# Patient Record
Sex: MALE | Race: WHITE | URBAN - METROPOLITAN AREA
[De-identification: names, ages, dates, MRNs, and addresses within clinical notes are randomized per-mention and may not be internally consistent; named-entity substitution may affect disease eponyms.]

---

## 2023-05-06 ENCOUNTER — OUT OF OFFICE VISIT (OUTPATIENT)
Dept: URBAN - METROPOLITAN AREA MEDICAL CENTER 1 | Facility: MEDICAL CENTER | Age: 63
End: 2023-05-06

## 2023-05-06 ENCOUNTER — LAB OUTSIDE AN ENCOUNTER (OUTPATIENT)
Dept: URBAN - NONMETROPOLITAN AREA CLINIC 2 | Facility: CLINIC | Age: 63
End: 2023-05-06

## 2023-05-06 ENCOUNTER — CLAIMS CREATED FROM THE CLAIM WINDOW (OUTPATIENT)
Dept: URBAN - METROPOLITAN AREA MEDICAL CENTER 1 | Facility: MEDICAL CENTER | Age: 63
End: 2023-05-06
Payer: MEDICARE

## 2023-05-06 DIAGNOSIS — K31.A11 GASTRIC INTESTINAL METAPLASIA WITHOUT DYSPLASIA, INVOLVING THE ANTRUM: ICD-10-CM

## 2023-05-06 DIAGNOSIS — K29.40 ATROPHIC GASTRITIS: ICD-10-CM

## 2023-05-06 DIAGNOSIS — D50.9 ANEMIA: ICD-10-CM

## 2023-05-06 DIAGNOSIS — D12.2 ADENOMA OF ASCENDING COLON: ICD-10-CM

## 2023-05-06 DIAGNOSIS — D12.5 ADENOMA OF SIGMOID COLON: ICD-10-CM

## 2023-05-06 PROCEDURE — 99232 SBSQ HOSP IP/OBS MODERATE 35: CPT | Performed by: INTERNAL MEDICINE

## 2023-05-06 PROCEDURE — 43239 EGD BIOPSY SINGLE/MULTIPLE: CPT | Performed by: INTERNAL MEDICINE

## 2023-05-06 PROCEDURE — 45385 COLONOSCOPY W/LESION REMOVAL: CPT | Performed by: INTERNAL MEDICINE

## 2023-05-08 ENCOUNTER — LAB OUTSIDE AN ENCOUNTER (OUTPATIENT)
Dept: URBAN - NONMETROPOLITAN AREA CLINIC 2 | Facility: CLINIC | Age: 63
End: 2023-05-08

## 2023-05-10 LAB
AP CASE REPORT: (no result)
AP CASE REPORT: (no result)
AP FINAL DIAGNOSIS: (no result)
AP FINAL DIAGNOSIS: (no result)
AP GROSS DESCRIPTION: (no result)
AP GROSS DESCRIPTION: (no result)
AP MICROSCOPIC DESCRIPTION: (no result)
AP MICROSCOPIC DESCRIPTION: (no result)
AP SPECIAL STAINS: (no result)

## 2023-07-14 ENCOUNTER — DASHBOARD ENCOUNTERS (OUTPATIENT)
Age: 63
End: 2023-07-14

## 2023-07-28 ENCOUNTER — OFFICE VISIT (OUTPATIENT)
Dept: URBAN - NONMETROPOLITAN AREA CLINIC 11 | Facility: CLINIC | Age: 63
End: 2023-07-28
Payer: MEDICARE

## 2023-07-28 VITALS
OXYGEN SATURATION: 99 % | DIASTOLIC BLOOD PRESSURE: 87 MMHG | WEIGHT: 173.4 LBS | BODY MASS INDEX: 25.68 KG/M2 | SYSTOLIC BLOOD PRESSURE: 189 MMHG | HEIGHT: 69 IN | HEART RATE: 75 BPM | TEMPERATURE: 99.1 F

## 2023-07-28 DIAGNOSIS — D50.0 IRON DEFICIENCY ANEMIA DUE TO CHRONIC BLOOD LOSS: ICD-10-CM

## 2023-07-28 PROCEDURE — 99204 OFFICE O/P NEW MOD 45 MIN: CPT | Performed by: INTERNAL MEDICINE

## 2023-07-28 RX ORDER — BUDESONIDE AND FORMOTEROL FUMARATE DIHYDRATE 160; 4.5 UG/1; UG/1
2 PUFFS AEROSOL RESPIRATORY (INHALATION) TWICE A DAY
Status: ACTIVE | COMMUNITY

## 2023-07-28 RX ORDER — LACOSAMIDE 50 MG/1
TAKE 1 TABLET BY MOUTH TWICE A DAY FOR ONE WEEK THEN 2 TABS TWICE A DAY THEREAFTER TABLET, FILM COATED ORAL
Qty: 120 EACH | Refills: 0 | Status: ACTIVE | COMMUNITY

## 2023-07-28 RX ORDER — HYDROXYZINE HYDROCHLORIDE 50 MG/1
TAKE 1 TABLET BY MOUTH DAILY AT BEDTIME AS NEEDED FOR 30 DAYS TABLET, FILM COATED ORAL
Qty: 90 EACH | Refills: 0 | Status: ACTIVE | COMMUNITY

## 2023-07-28 RX ORDER — RANOLAZINE 500 MG/1
1 TABLET TABLET, FILM COATED, EXTENDED RELEASE ORAL TWICE A DAY
Qty: 180 TABLET | Status: ACTIVE | COMMUNITY

## 2023-07-28 RX ORDER — ATORVASTATIN CALCIUM, FILM COATED 40 MG/1
TAKE 1 TABLET BY MOUTH 1 TIME EACH DAY TABLET ORAL
Qty: 90 EACH | Refills: 1 | Status: ACTIVE | COMMUNITY

## 2023-07-28 RX ORDER — CLOPIDOGREL BISULFATE 75 MG/1
TAKE 1 TABLET BY MOUTH EVERY DAY FOR 30 DAYS TABLET, FILM COATED ORAL ONCE A DAY
Refills: 0 | Status: ACTIVE | COMMUNITY

## 2023-07-28 RX ORDER — KETOROLAC TROMETHAMINE 10 MG/1
TAKE 1 TABLET BY MOUTH EVERY 8 HOURS AS NEEDED FOR PAIN (ON SCALE 1-10) TABLET, FILM COATED ORAL
Qty: 15 EACH | Refills: 0 | Status: ACTIVE | COMMUNITY

## 2023-07-28 RX ORDER — SUMATRIPTAN 50 MG/1
TAKE 1 TABLET BY MOUTH EVERY 12 HOURS AS NEEDED FOR MIGRAINE TABLET, FILM COATED ORAL
Qty: 27 EACH | Refills: 2 | Status: ACTIVE | COMMUNITY

## 2023-07-28 RX ORDER — TRAMADOL HYDROCHLORIDE 50 MG/1
TAKE 1 TABLET BY MOUTH TWICE A DAY AS NEEDED FOR 15 DAYS TABLET, FILM COATED ORAL
Qty: 30 EACH | Refills: 0 | Status: ACTIVE | COMMUNITY

## 2023-07-28 RX ORDER — LISINOPRIL 40 MG/1
TAKE 1 TABLET BY MOUTH 1 TIME EACH DAY TABLET ORAL
Qty: 90 EACH | Refills: 1 | Status: ACTIVE | COMMUNITY

## 2023-07-28 RX ORDER — RIVAROXABAN 2.5 MG/1
TAKE 1 TABLET BY MOUTH TWICE A DAY FOR 30 DAYS TABLET, FILM COATED ORAL
Qty: 60 EACH | Refills: 4 | Status: ACTIVE | COMMUNITY

## 2023-07-28 RX ORDER — GABAPENTIN 300 MG/1
2 CAPSULES CAPSULE ORAL THREE TIMES A DAY
Qty: 180 CAPSULE | Status: ACTIVE | COMMUNITY

## 2023-07-28 NOTE — HPI-TODAY'S VISIT:
63-year-old male is here for evaluation of iron deficiency anemia.  Patient has a history of small bowel AVMs.  AVMs were found on the PillCam in 2020.  Recently had an EGD and colonoscopy May 2023 which did not reveal a source of bleeding.  The patient is here for repeat capsule endoscopy to rule out recurrent small bowel AVMs.  Patient is status post 5 units packed red blood cells for the past several months.  His hemoglobin went down as low as 4.8.  Currently the patient does take aspirin and Plavix.  Previously he was taking Xarelto which has been discontinued.  Currently denies any melena or prior history of peptic ulcer disease.  His appetite is good and there is no weight loss

## 2023-07-29 ENCOUNTER — LAB OUTSIDE AN ENCOUNTER (OUTPATIENT)
Dept: URBAN - NONMETROPOLITAN AREA CLINIC 11 | Facility: CLINIC | Age: 63
End: 2023-07-29

## 2023-09-02 ENCOUNTER — WEB ENCOUNTER (OUTPATIENT)
Dept: URBAN - METROPOLITAN AREA CLINIC 43 | Facility: CLINIC | Age: 63
End: 2023-09-02

## 2023-09-06 ENCOUNTER — OFFICE VISIT (OUTPATIENT)
Dept: URBAN - METROPOLITAN AREA CLINIC 43 | Facility: CLINIC | Age: 63
End: 2023-09-06
Payer: MEDICARE

## 2023-09-06 VITALS
SYSTOLIC BLOOD PRESSURE: 146 MMHG | BODY MASS INDEX: 24.71 KG/M2 | HEIGHT: 69 IN | TEMPERATURE: 98.1 F | DIASTOLIC BLOOD PRESSURE: 62 MMHG | HEART RATE: 76 BPM | WEIGHT: 166.8 LBS

## 2023-09-06 DIAGNOSIS — D50.0 IRON DEFICIENCY ANEMIA DUE TO CHRONIC BLOOD LOSS: ICD-10-CM

## 2023-09-06 PROCEDURE — 91110 GI TRC IMG INTRAL ESOPH-ILE: CPT | Performed by: INTERNAL MEDICINE

## 2023-09-06 RX ORDER — BUDESONIDE AND FORMOTEROL FUMARATE DIHYDRATE 160; 4.5 UG/1; UG/1
2 PUFFS AEROSOL RESPIRATORY (INHALATION) TWICE A DAY
Status: ACTIVE | COMMUNITY

## 2023-09-06 RX ORDER — SUMATRIPTAN 50 MG/1
TAKE 1 TABLET BY MOUTH EVERY 12 HOURS AS NEEDED FOR MIGRAINE TABLET, FILM COATED ORAL
Qty: 27 EACH | Refills: 2 | Status: ACTIVE | COMMUNITY

## 2023-09-06 RX ORDER — RANOLAZINE 500 MG/1
1 TABLET TABLET, FILM COATED, EXTENDED RELEASE ORAL TWICE A DAY
Qty: 180 TABLET | Status: ACTIVE | COMMUNITY

## 2023-09-06 RX ORDER — GABAPENTIN 300 MG/1
2 CAPSULES CAPSULE ORAL THREE TIMES A DAY
Qty: 180 CAPSULE | Status: ACTIVE | COMMUNITY

## 2023-09-06 RX ORDER — RIVAROXABAN 2.5 MG/1
TAKE 1 TABLET BY MOUTH TWICE A DAY FOR 30 DAYS TABLET, FILM COATED ORAL
Qty: 60 EACH | Refills: 4 | Status: ACTIVE | COMMUNITY

## 2023-09-06 RX ORDER — ATORVASTATIN CALCIUM, FILM COATED 40 MG/1
TAKE 1 TABLET BY MOUTH 1 TIME EACH DAY TABLET ORAL
Qty: 90 EACH | Refills: 1 | Status: ACTIVE | COMMUNITY

## 2023-09-06 RX ORDER — TRAMADOL HYDROCHLORIDE 50 MG/1
TAKE 1 TABLET BY MOUTH TWICE A DAY AS NEEDED FOR 15 DAYS TABLET, FILM COATED ORAL
Qty: 30 EACH | Refills: 0 | Status: ACTIVE | COMMUNITY

## 2023-09-06 RX ORDER — LACOSAMIDE 50 MG/1
TAKE 1 TABLET BY MOUTH TWICE A DAY FOR ONE WEEK THEN 2 TABS TWICE A DAY THEREAFTER TABLET, FILM COATED ORAL
Qty: 120 EACH | Refills: 0 | Status: ACTIVE | COMMUNITY

## 2023-09-06 RX ORDER — KETOROLAC TROMETHAMINE 10 MG/1
TAKE 1 TABLET BY MOUTH EVERY 8 HOURS AS NEEDED FOR PAIN (ON SCALE 1-10) TABLET, FILM COATED ORAL
Qty: 15 EACH | Refills: 0 | Status: ACTIVE | COMMUNITY

## 2023-09-06 RX ORDER — LISINOPRIL 40 MG/1
TAKE 1 TABLET BY MOUTH 1 TIME EACH DAY TABLET ORAL
Qty: 90 EACH | Refills: 1 | Status: ACTIVE | COMMUNITY

## 2023-09-06 RX ORDER — CLOPIDOGREL BISULFATE 75 MG/1
TAKE 1 TABLET BY MOUTH EVERY DAY FOR 30 DAYS TABLET, FILM COATED ORAL ONCE A DAY
Refills: 0 | Status: ACTIVE | COMMUNITY

## 2023-09-06 RX ORDER — HYDROXYZINE HYDROCHLORIDE 50 MG/1
TAKE 1 TABLET BY MOUTH DAILY AT BEDTIME AS NEEDED FOR 30 DAYS TABLET, FILM COATED ORAL
Qty: 90 EACH | Refills: 0 | Status: ACTIVE | COMMUNITY

## 2023-09-14 ENCOUNTER — TELEPHONE ENCOUNTER (OUTPATIENT)
Dept: URBAN - METROPOLITAN AREA CLINIC 44 | Facility: CLINIC | Age: 63
End: 2023-09-14

## 2023-09-15 ENCOUNTER — LAB OUTSIDE AN ENCOUNTER (OUTPATIENT)
Dept: URBAN - METROPOLITAN AREA CLINIC 44 | Facility: CLINIC | Age: 63
End: 2023-09-15

## 2023-09-18 ENCOUNTER — LAB OUTSIDE AN ENCOUNTER (OUTPATIENT)
Dept: URBAN - METROPOLITAN AREA CLINIC 43 | Facility: CLINIC | Age: 63
End: 2023-09-18

## 2023-09-22 ENCOUNTER — OFFICE VISIT (OUTPATIENT)
Dept: URBAN - NONMETROPOLITAN AREA MEDICAL CENTER 6 | Facility: MEDICAL CENTER | Age: 63
End: 2023-09-22
Payer: MEDICARE

## 2023-09-22 DIAGNOSIS — K92.2 ACUTE GASTROINTESTINAL BLEEDING: ICD-10-CM

## 2023-09-22 DIAGNOSIS — D50.9 ANEMIA: ICD-10-CM

## 2023-09-22 PROCEDURE — 44360 SMALL BOWEL ENDOSCOPY: CPT | Performed by: INTERNAL MEDICINE

## 2023-09-25 ENCOUNTER — LAB OUTSIDE AN ENCOUNTER (OUTPATIENT)
Dept: URBAN - METROPOLITAN AREA CLINIC 44 | Facility: CLINIC | Age: 63
End: 2023-09-25

## 2023-09-25 ENCOUNTER — TELEPHONE ENCOUNTER (OUTPATIENT)
Dept: URBAN - METROPOLITAN AREA CLINIC 44 | Facility: CLINIC | Age: 63
End: 2023-09-25

## 2023-10-27 ENCOUNTER — TELEPHONE ENCOUNTER (OUTPATIENT)
Dept: URBAN - METROPOLITAN AREA CLINIC 23 | Facility: CLINIC | Age: 63
End: 2023-10-27

## 2023-11-06 ENCOUNTER — TELEPHONE ENCOUNTER (OUTPATIENT)
Dept: URBAN - METROPOLITAN AREA CLINIC 44 | Facility: CLINIC | Age: 63
End: 2023-11-06